# Patient Record
Sex: MALE | Race: WHITE
[De-identification: names, ages, dates, MRNs, and addresses within clinical notes are randomized per-mention and may not be internally consistent; named-entity substitution may affect disease eponyms.]

---

## 2020-07-28 ENCOUNTER — HOSPITAL ENCOUNTER (EMERGENCY)
Dept: HOSPITAL 33 - ED | Age: 75
Discharge: ANOTHER HOSPITAL | End: 2020-07-28
Payer: MEDICARE

## 2020-07-28 VITALS — OXYGEN SATURATION: 98 %

## 2020-07-28 VITALS — SYSTOLIC BLOOD PRESSURE: 133 MMHG | DIASTOLIC BLOOD PRESSURE: 70 MMHG | HEART RATE: 97 BPM

## 2020-07-28 DIAGNOSIS — I21.3: ICD-10-CM

## 2020-07-28 DIAGNOSIS — R74.8: ICD-10-CM

## 2020-07-28 DIAGNOSIS — S42.012A: Primary | ICD-10-CM
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
## 2020-07-28 DIAGNOSIS — M25.512: ICD-10-CM
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
## 2020-07-28 LAB
ALBUMIN SERPL-MCNC: 4.4 G/DL (ref 3.5–5)
ALP SERPL-CCNC: 80 U/L (ref 38–126)
ALT SERPL-CCNC: 30 U/L (ref 0–50)
ANION GAP SERPL CALC-SCNC: 10.9 MEQ/L (ref 5–15)
AST SERPL QL: 31 U/L (ref 17–59)
BASOPHILS # BLD AUTO: 0.01 10*3/UL (ref 0–0.4)
BASOPHILS NFR BLD AUTO: 0.1 % (ref 0–0.4)
BILIRUB BLD-MCNC: 0.9 MG/DL (ref 0.2–1.3)
BLD SMEAR INTERP: YES
BUN SERPL-MCNC: 13 MG/DL (ref 9–20)
CALCIUM SPEC-MCNC: 9.8 MG/DL (ref 8.4–10.2)
CHLORIDE SERPL-SCNC: 104 MMOL/L (ref 98–107)
CO2 SERPL-SCNC: 30 MMOL/L (ref 22–30)
CREAT SERPL-MCNC: 0.89 MG/DL (ref 0.66–1.25)
EOSINOPHIL # BLD AUTO: 0.03 10*3/UL (ref 0–0.5)
GLUCOSE SERPL-MCNC: 93 MG/DL (ref 74–106)
HCT VFR BLD AUTO: 46.4 % (ref 42–50)
HGB BLD-MCNC: 15.1 GM/DL (ref 12.5–18)
LYMPHOCYTES # SPEC AUTO: 0.49 10*3/UL (ref 1–4.6)
MAGNESIUM SERPL-MCNC: 2.1 MG/DL (ref 1.6–2.3)
MCH RBC QN AUTO: 32.6 PG (ref 26–32)
MCHC RBC AUTO-ENTMCNC: 32.5 G/DL (ref 32–36)
MONOCYTES # BLD AUTO: 0.86 10*3/UL (ref 0–1.3)
PLATELET # BLD AUTO: 128 K/MM3 (ref 150–450)
POTASSIUM SERPLBLD-SCNC: 4.7 MMOL/L (ref 3.5–5.1)
PROT SERPL-MCNC: 7.4 G/DL (ref 6.3–8.2)
RBC # BLD AUTO: 4.63 M/MM3 (ref 4.1–5.6)
SODIUM SERPL-SCNC: 140 MMOL/L (ref 137–145)
WBC # BLD AUTO: 10.2 K/MM3 (ref 4–10.5)

## 2020-07-28 PROCEDURE — 93005 ELECTROCARDIOGRAM TRACING: CPT

## 2020-07-28 PROCEDURE — 82550 ASSAY OF CK (CPK): CPT

## 2020-07-28 PROCEDURE — 93041 RHYTHM ECG TRACING: CPT

## 2020-07-28 PROCEDURE — 85025 COMPLETE CBC W/AUTO DIFF WBC: CPT

## 2020-07-28 PROCEDURE — 94760 N-INVAS EAR/PLS OXIMETRY 1: CPT

## 2020-07-28 PROCEDURE — 73030 X-RAY EXAM OF SHOULDER: CPT

## 2020-07-28 PROCEDURE — 84484 ASSAY OF TROPONIN QUANT: CPT

## 2020-07-28 PROCEDURE — 96374 THER/PROPH/DIAG INJ IV PUSH: CPT

## 2020-07-28 PROCEDURE — 99285 EMERGENCY DEPT VISIT HI MDM: CPT

## 2020-07-28 PROCEDURE — 83735 ASSAY OF MAGNESIUM: CPT

## 2020-07-28 PROCEDURE — 36415 COLL VENOUS BLD VENIPUNCTURE: CPT

## 2020-07-28 PROCEDURE — 80053 COMPREHEN METABOLIC PANEL: CPT

## 2020-07-28 NOTE — XRAY
Indication: Pain following fall.



Comparison: None



3 view left shoulder demonstrates mildly displaced distal clavicle shaft

fracture with soft tissue swelling.  Elsewhere osteopenia.  No other bony,

articular, or soft tissue abnormalities.

## 2020-07-28 NOTE — ERPHSYRPT
- History of Present Illness


Time Seen by Provider: 07/28/20 12:30


Source: patient


Exam Limitations: no limitations


Physician History: 





Patient is a 74-year-old male presents to our ED with complaints of left-sided 

shoulder pain.  Patient arrived via EMS non-boarded non-collared.  Patient 

states he awoke this morning and fell out of bed.  Patient is not sure what 

caused him to fall out of bed.  Caregiver states that patient had been on the 

floor for approximately 30 minutes.  Patient denies headache.  No neck pain.  

Cervical spine cleared clinically.  Patient denies loss of consciousness.  The 

fall was not associated with neuro or cardiovascular symptomology Per patient.  

Patient complains of left shoulder pain.  Pain described as an ache that is well

localized.  No radiation.  Pain worse with movement and palpation.  Pain 

improved with rest.  No associated numbness tingling or weakness.  No aphasia.  

No neurological symptomology.


Timing/Duration: today


Severity: moderate


Associated Symptoms: denies symptoms, No nausea, No vomiting, No abdominal pain,

No shortness of breath, No heartburn, No diaphoresis, No cough, No chills, No 

chest pain, No fever, No headaches, No loss of appetite, No malaise, No rash, No

syncope, No seizure, No weakness


Allergies/Adverse Reactions: 








No Known Drug Allergies Allergy (Unverified 07/28/20 12:36)


   





Home Medications: 








Carbidopa/Levodopa [Carbidopa-Levodopa  Tab] 1 tab PO DAILY 07/28/20 

[History]


Donepezil HCl 10 mg PO DAILY 07/28/20 [History]


Memantine HCl [Namenda] 10 mg PO DAILY 07/28/20 [History]


Pramipexole Di-HCl [Pramipexole Dihydrochloride] 0.5 mg PO DAILY 07/28/20 [Histo

ry]








- Review of Systems


Constitutional: No Symptoms, No Fever, No Chills


Eyes: No Symptoms


Ears, Nose, & Throat: No Symptoms


Respiratory: No Symptoms, No Cough, No Dyspnea


Cardiac: No Symptoms, No Chest Pain, No Edema, No Syncope


Abdominal/Gastrointestinal: No Symptoms, No Abdominal Pain, No Nausea, No 

Vomiting, No Diarrhea


Genitourinary Symptoms: No Symptoms, No Dysuria


Musculoskeletal: No Symptoms, No Back Pain, No Neck Pain


Skin: No Symptoms, No Rash


Neurological: No Symptoms, No Dizziness, No Focal Weakness, No Sensory Changes


Psychological: No Symptoms


Endocrine: No Symptoms


Hematologic/Lymphatic: No Symptoms


Immunological/Allergic: No Symptoms


All Other Systems: Reviewed and Negative





- Past Medical History


Pertinent Past Medical History: Yes





- Nursing Vital Signs


Nursing Vital Signs: 


                               Initial Vital Signs











Temperature  97.8 F   07/28/20 12:26


 


Pulse Rate  98 H  07/28/20 12:26


 


Respiratory Rate  22   07/28/20 12:26


 


Blood Pressure  139/80   07/28/20 12:26


 


O2 Sat by Pulse Oximetry  98   07/28/20 12:26








                                   Pain Scale











Pain Intensity                 4

















- Physical Exam


General Appearance: no apparent distress, alert


Eye Exam: PERRL/EOMI, eyes nml inspection


Ears, Nose, Throat Exam: normal ENT inspection, TMs normal, pharynx normal, 

moist mucous membranes


Neck Exam: normal inspection, non-tender, supple, full range of motion


Respiratory Exam: normal breath sounds, lungs clear, No respiratory distress


Cardiovascular Exam: regular rate/rhythm, normal heart sounds, normal peripheral

pulses


Gastrointestinal/Abdomen Exam: soft, normal bowel sounds, No tenderness, No mass


Back Exam: normal inspection, normal range of motion, No CVA tenderness, No 

vertebral tenderness


Extremity Exam: normal inspection, normal range of motion, pelvis stable, other 

(No neck pain.  No C-spine tenderness.  Cervical spine cleared clinically.  

Bruising to left AC joint area.  Left shoulder range of motion limited due to 

pain.  Left upper extremity neurovascular intact distally.  Cap refill less than

2 seconds.  Compartments are soft.), No lacerations


Neurologic Exam: alert, oriented x 3, cooperative, normal mood/affect, nml 

cerebellar function, sensation nml, No motor deficits, No sensory deficit, No un

cooperative, No motor weakness, No facial droop, No slurred speech, No aphasia, 

No dysarthria, No abnormal cerebellar tests, No abnormal CNs II-XII


Skin Exam: normal color, warm, dry, No rash


Lymphatic Exam: No adenopathy


**SpO2 Interpretation**: normal


SpO2: 98


O2 Delivery: Room Air





- Course


Nursing assessment & vital signs reviewed: Yes


EKG Interpreted by Me: RATE ([id number]), Sinus Tach, NORMAL AXIS, NORMAL INTERVALS





- Radiology Exams


  ** Shoulder


X-ray Interpretation: Teleradiologist Report (Mildly displaced distal clavicle 

shaft fracture with soft tissue swelling.  Osteopenia.)


Ordered Tests: 


                               Active Orders 24 hr











 Category Date Time Status


 


 Cardiac Monitor STAT Care  07/28/20 12:40 Active


 


 EKG-ER Only STAT Care  07/28/20 12:39 Active


 


 Pulse Oximetry (ED) STAT Care  07/28/20 12:39 Active


 


 SHOULDER Stat Exams  07/28/20 12:54 Completed


 


 CBC W DIFF Stat Lab  07/28/20 12:57 Completed


 


 CK (IN-HOUSE) [CK-Creatinine Phosphokinase] Stat Lab  07/28/20 13:48 Completed


 


 CMP Stat Lab  07/28/20 12:57 Completed


 


 MAGNESIUM Stat Lab  07/28/20 12:57 Completed


 


 TROPONIN Q3H Lab  07/28/20 12:57 Completed


 


 TROPONIN Q3H Lab  07/28/20 15:45 Ordered


 


 TROPONIN Q3H Lab  07/28/20 18:45 Ordered


 


 TROPONIN Q3H Lab  07/28/20 21:45 Ordered


 


 TROPONIN Q3H Lab  07/29/20 00:45 Ordered


 


 UA W/RFX UR CULTURE Stat Lab  07/28/20 12:40 Uncollected








Medication Summary














Discontinued Medications














Generic Name Dose Route Start Last Admin





  Trade Name Drewq  PRN Reason Stop Dose Admin


 


Aspirin  324 mg  07/28/20 14:05  07/28/20 14:11





  Baby Aspirin 81 Mg Chew***  PO  07/28/20 14:06  324 mg





  STAT ONE   Administration


 


Aspirin  Confirm  07/28/20 14:09 





  Baby Aspirin 81 Mg Chew***  Administered  07/28/20 14:10 





  Dose  





  324 mg  





  .ROUTE  





  .STK-MED ONE  


 


Sodium Chloride  1,000 mls @ 100 mls/hr  07/28/20 12:45  07/28/20 12:50





  Sodium Chloride 0.9% 1000 Ml  IV  08/27/20 12:44  Not Given





  .Q10H [facility]  











Lab/Rad Data: 


                           Laboratory Result Diagrams





                                 07/28/20 12:57 





                                 07/28/20 12:57 





                               Laboratory Results











  07/28/20 07/28/20 07/28/20 Range/Units





  13:48 12:57 12:57 


 


WBC     (4.0-10.5)  K/mm3


 


RBC     (4.1-5.6)  M/mm3


 


Hgb     (12.5-18.0)  gm/dl


 


Hct     (42-50)  %


 


MCV     ()  fl


 


MCH     (26-32)  pg


 


MCHC     (32-36)  g/dl


 


RDW     (11.5-14.0)  %


 


Plt Count     (150-450)  K/mm3


 


MPV     (7.5-11.0)  fl


 


Gran %     (36.0-66.0)  %


 


Eos # (Auto)     (0-0.5)  


 


Absolute Lymphs (auto)     (1.0-4.6)  


 


Absolute Monos (auto)     (0.0-1.3)  


 


Lymphocytes %     (24.0-44.0)  %


 


Monocytes %     (0.0-12.0)  %


 


Eosinophils %     (0.00-5.0)  %


 


Basophils %     (0.0-0.4)  %


 


Absolute Granulocytes     (1.4-6.9)  


 


Basophils #     (0-0.4)  


 


Sodium    140  (137-145)  mmol/L


 


Potassium    4.7  (3.5-5.1)  mmol/L


 


Chloride    104  ()  mmol/L


 


Carbon Dioxide    30  (22-30)  mmol/L


 


Anion Gap    10.9  (5-15)  MEQ/L


 


BUN    13  (9-20)  mg/dL


 


Creatinine    0.89  (0.66-1.25)  mg/dL


 


Estimated GFR    > 60.0  ML/MIN


 


Glucose    93  ()  mg/dL


 


Calcium    9.8  (8.4-10.2)  mg/dL


 


Magnesium    2.1  (1.6-2.3)  mg/dL


 


Total Bilirubin    0.90  (0.2-1.3)  mg/dL


 


AST    31  (17-59)  U/L


 


ALT    30  (0-50)  U/L


 


Alkaline Phosphatase    80  ()  U/L


 


Creatine Kinase  443 H    ()  U/L


 


Troponin I   0.037 H*   (0.000-0.034)  ng/mL


 


Serum Total Protein    7.4  (6.3-8.2)  g/dL


 


Albumin    4.4  (3.5-5.0)  g/dL














  07/28/20 Range/Units





  12:57 


 


WBC  10.2  (4.0-10.5)  K/mm3


 


RBC  4.63  (4.1-5.6)  M/mm3


 


Hgb  15.1  (12.5-18.0)  gm/dl


 


Hct  46.4  (42-50)  %


 


MCV  100.2 H  ()  fl


 


MCH  32.6 H  (26-32)  pg


 


MCHC  32.5  (32-36)  g/dl


 


RDW  12.8  (11.5-14.0)  %


 


Plt Count  128 L  (150-450)  K/mm3


 


MPV  11.3 H  (7.5-11.0)  fl


 


Gran %  86.4 H  (36.0-66.0)  %


 


Eos # (Auto)  0.03  (0-0.5)  


 


Absolute Lymphs (auto)  0.49 L  (1.0-4.6)  


 


Absolute Monos (auto)  0.86  (0.0-1.3)  


 


Lymphocytes %  4.8 L  (24.0-44.0)  %


 


Monocytes %  8.4  (0.0-12.0)  %


 


Eosinophils %  0.3  (0.00-5.0)  %


 


Basophils %  0.1  (0.0-0.4)  %


 


Absolute Granulocytes  8.82 H  (1.4-6.9)  


 


Basophils #  0.01  (0-0.4)  


 


Sodium   (137-145)  mmol/L


 


Potassium   (3.5-5.1)  mmol/L


 


Chloride   ()  mmol/L


 


Carbon Dioxide   (22-30)  mmol/L


 


Anion Gap   (5-15)  MEQ/L


 


BUN   (9-20)  mg/dL


 


Creatinine   (0.66-1.25)  mg/dL


 


Estimated GFR   ML/MIN


 


Glucose   ()  mg/dL


 


Calcium   (8.4-10.2)  mg/dL


 


Magnesium   (1.6-2.3)  mg/dL


 


Total Bilirubin   (0.2-1.3)  mg/dL


 


AST   (17-59)  U/L


 


ALT   (0-50)  U/L


 


Alkaline Phosphatase   ()  U/L


 


Creatine Kinase   ()  U/L


 


Troponin I   (0.000-0.034)  ng/mL


 


Serum Total Protein   (6.3-8.2)  g/dL


 


Albumin   (3.5-5.0)  g/dL














- Progress


Progress: improved


Progress Note: 





07/28/20 14:17


Patient reassessed.  He has no chest pain.  Shoulder x-ray reveals a distal left

clavicle fracture and osteopenia.  Initial troponin elevated 0.037.  EKG 

negative for ST segment changes/ischemia.  Patient's family physician is Dr. Butch García.  Patient son at bedside.  He requests transfer to [facility] as Dr. García practices at [facility].  Dr. Segura is the 

accepting physician.  


07/28/20 14:30





Counseled pt/family regarding: lab results, diagnosis, rad results





- Departure


Departure Disposition: Transfer (Transfer to [facility].)


Clinical Impression: 


 Clavicle fracture, sternal end, Elevated troponin, Fall, NSTEMI (non-ST 

elevated myocardial infarction)





Condition: Stable


Critical Care Time: No


Referrals: 


BUTCH GARCÍA [Primary Care Provider] -